# Patient Record
(demographics unavailable — no encounter records)

---

## 2025-01-08 NOTE — HEALTH RISK ASSESSMENT
[0] : 2) Feeling down, depressed, or hopeless: Not at all (0) [PHQ-2 Negative - No further assessment needed] : PHQ-2 Negative - No further assessment needed [[id number]] : 0 [Never] : Never

## 2025-01-08 NOTE — HEALTH RISK ASSESSMENT
[0] : 2) Feeling down, depressed, or hopeless: Not at all (0) [PHQ-2 Negative - No further assessment needed] : PHQ-2 Negative - No further assessment needed [[id number]] : 0 [Never] : Never

## 2025-01-08 NOTE — HISTORY OF PRESENT ILLNESS
[de-identified] : 34 y/o female presents for f/u Last CPE 2/24. Has anxiety-on Lexapro and Xanax (up to 1.5 mg daily). UTD with HCM. Has some IBS. TTC. [FreeTextEntry8] : Pt had intermittent CP around the holidays. Went to urgent care and had normal EKG, XRAY and labs (including DDIMER). The week prior, she had a viral GI bug which caused agressive copious vomitting.  Pain was superior to heart on left and radiated to back.  Also had some chest wall tenderness. Pain now resolved but kept appoimtment. No hematemesis.  Doing well on current psych regimen.

## 2025-01-08 NOTE — HISTORY OF PRESENT ILLNESS
[de-identified] : 32 y/o female presents for f/u Last CPE 2/24. Has anxiety-on Lexapro and Xanax (up to 1.5 mg daily). UTD with HCM. Has some IBS. TTC. [FreeTextEntry8] : Pt had intermittent CP around the holidays. Went to urgent care and had normal EKG, XRAY and labs (including DDIMER). The week prior, she had a viral GI bug which caused agressive copious vomitting.  Pain was superior to heart on left and radiated to back.  Also had some chest wall tenderness. Pain now resolved but kept appoimtment. No hematemesis.  Doing well on current psych regimen.

## 2025-01-08 NOTE — ASSESSMENT
[FreeTextEntry1] : Pain most likely due to Costochondritis vs esphagiitis/microtear , now resolved. NO further w/u necessary.

## 2025-05-28 NOTE — HISTORY OF PRESENT ILLNESS
[de-identified] : 34 y/o female prsents for CPE Wants to start fertility treatments. Adopted a dog last week. Feeling generally well.

## 2025-05-28 NOTE — HEALTH RISK ASSESSMENT
[Former] : Former [0-4] : 0-4 [< 15 Years] : < 15 Years [NO] : No [Fully functional (bathing, dressing, toileting, transferring, walking, feeding)] : Fully functional (bathing, dressing, toileting, transferring, walking, feeding) [Fully functional (using the telephone, shopping, preparing meals, housekeeping, doing laundry, using] : Fully functional and needs no help or supervision to perform IADLs (using the telephone, shopping, preparing meals, housekeeping, doing laundry, using transportation, managing medications and managing finances) [Good] : ~his/her~  mood as  good [2 - 4 times a month (2 pts)] : 2-4 times a month (2 points) [1 or 2 (0 pts)] : 1 or 2 (0 points) [Never (0 pts)] : Never (0 points) [No] : In the past 12 months have you used drugs other than those required for medical reasons? No [No falls in past year] : Patient reported no falls in the past year [0] : 2) Feeling down, depressed, or hopeless: Not at all (0) [PHQ-2 Negative - No further assessment needed] : PHQ-2 Negative - No further assessment needed [Audit-CScore] : 2 [[id number]] : 0 [None] : Patient does not have any barriers to medication adherence [Yes] : Reviewed medication list for presence of high-risk medications. [Benzodiazepines] : benzodiazepines [Opioids] : opioids [Muscle Relaxants] : muscle relaxants [Sedatives] : sedatives [HIV test declined] : HIV test declined [Hepatitis C test declined] : Hepatitis C test declined [Change in mental status noted] : No change in mental status noted [Language] : denies difficulty with language [Behavior] : denies difficulty with behavior [Learning/Retaining New Information] : denies difficulty learning/retaining new information [Handling Complex Tasks] : denies difficulty handling complex tasks [Reasoning] : denies difficulty with reasoning [Spatial Ability and Orientation] : denies difficulty with spatial ability and orientation [With Family] : lives with family [Employed] : employed [] :  [# Of Children ___] : has [unfilled] children [Sexually Active] : sexually active [High Risk Behavior] : no high risk behavior [Feels Safe at Home] : Feels safe at home [Reports changes in hearing] : Reports no changes in hearing [Reports changes in vision] : Reports no changes in vision [Reports changes in dental health] : Reports no changes in dental health [Smoke Detector] : smoke detector [Safety elements used in home] : safety elements used in home [Seat Belt] :  uses seat belt [Sunscreen] : uses sunscreen

## 2025-05-28 NOTE — ASSESSMENT
[Vaccines Reviewed] : Immunizations reviewed today. Please see immunization details in the vaccine log within the immunization flowsheet.  [FreeTextEntry1] : 33 year is here for a CPE. She was counselled on diet and exercise, drug and alcohol use, age appropriate health care maintenance including vaccines, seatbelts, sunscreen, stress reduction and safe sex. All questions asked/answered to the best of my ability. Labs sent.

## 2025-06-27 NOTE — HISTORY OF PRESENT ILLNESS
[de-identified] : Here for multiple issues. Undergoing fertility w/u with Dr Singer tatum tthe moment. Her rubella titers are low. She has been a non-responder since childhood and has received MANY boosters. However, she has to receive another in order to proceed with treatment. Accepted to grad school. Support dog going well. Wants new psychiatrist-preferably a woman that deals with pregnancy.  Trying to taper off Xanax prior to pregnancy. Plans to stay on Lexapro as it works well.

## 2025-06-27 NOTE — PHYSICAL EXAM
[Normal Sclera/Conjunctiva] : normal sclera/conjunctiva [Normal Outer Ear/Nose] : the outer ears and nose were normal in appearance [No JVD] : no jugular venous distention [No Respiratory Distress] : no respiratory distress  [Normal Rate] : normal rate  [Normal] : normal gait, coordination grossly intact, no focal deficits and deep tendon reflexes were 2+ and symmetric

## 2025-06-27 NOTE — ASSESSMENT
[FreeTextEntry1] : MMR booster given. Advised that I can write her a letter stating she is a non-reponder going forward. I am happy to assume her Lexapro script when needed. I recommended two psychiatrist who might be better suited for her.